# Patient Record
Sex: MALE | Race: OTHER | NOT HISPANIC OR LATINO
[De-identification: names, ages, dates, MRNs, and addresses within clinical notes are randomized per-mention and may not be internally consistent; named-entity substitution may affect disease eponyms.]

---

## 2017-07-07 ENCOUNTER — RESULT REVIEW (OUTPATIENT)
Age: 77
End: 2017-07-07

## 2017-07-07 ENCOUNTER — OUTPATIENT (OUTPATIENT)
Dept: OUTPATIENT SERVICES | Facility: HOSPITAL | Age: 77
LOS: 1 days | End: 2017-07-07
Payer: COMMERCIAL

## 2017-07-07 DIAGNOSIS — J47.9 BRONCHIECTASIS, UNCOMPLICATED: ICD-10-CM

## 2017-07-07 LAB
GRAM STN FLD: SIGNIFICANT CHANGE UP
NIGHT BLUE STAIN TISS: SIGNIFICANT CHANGE UP
SPECIMEN SOURCE: SIGNIFICANT CHANGE UP
SPECIMEN SOURCE: SIGNIFICANT CHANGE UP

## 2017-07-07 PROCEDURE — 87102 FUNGUS ISOLATION CULTURE: CPT

## 2017-07-07 PROCEDURE — 88112 CYTOPATH CELL ENHANCE TECH: CPT

## 2017-07-07 PROCEDURE — 87015 SPECIMEN INFECT AGNT CONCNTJ: CPT

## 2017-07-07 PROCEDURE — 31623 DX BRONCHOSCOPE/BRUSH: CPT

## 2017-07-07 PROCEDURE — 88112 CYTOPATH CELL ENHANCE TECH: CPT | Mod: 26

## 2017-07-07 PROCEDURE — 88305 TISSUE EXAM BY PATHOLOGIST: CPT | Mod: 26

## 2017-07-07 PROCEDURE — 87070 CULTURE OTHR SPECIMN AEROBIC: CPT

## 2017-07-07 PROCEDURE — 31624 DX BRONCHOSCOPE/LAVAGE: CPT | Mod: LT

## 2017-07-07 PROCEDURE — 87206 SMEAR FLUORESCENT/ACID STAI: CPT

## 2017-07-07 PROCEDURE — 88305 TISSUE EXAM BY PATHOLOGIST: CPT

## 2017-07-07 PROCEDURE — 87116 MYCOBACTERIA CULTURE: CPT

## 2017-07-09 LAB
CULTURE RESULTS: SIGNIFICANT CHANGE UP
SPECIMEN SOURCE: SIGNIFICANT CHANGE UP

## 2017-08-03 LAB
CULTURE RESULTS: SIGNIFICANT CHANGE UP
SPECIMEN SOURCE: SIGNIFICANT CHANGE UP

## 2017-08-26 LAB
CULTURE RESULTS: SIGNIFICANT CHANGE UP
SPECIMEN SOURCE: SIGNIFICANT CHANGE UP

## 2018-10-02 ENCOUNTER — APPOINTMENT (OUTPATIENT)
Dept: PULMONOLOGY | Facility: CLINIC | Age: 78
End: 2018-10-02
Payer: MEDICARE

## 2018-10-02 VITALS
BODY MASS INDEX: 27.28 KG/M2 | WEIGHT: 180 LBS | DIASTOLIC BLOOD PRESSURE: 73 MMHG | SYSTOLIC BLOOD PRESSURE: 176 MMHG | OXYGEN SATURATION: 97 % | HEART RATE: 57 BPM | HEIGHT: 68 IN

## 2018-10-02 VITALS — OXYGEN SATURATION: 94 %

## 2018-10-02 DIAGNOSIS — Z86.39 PERSONAL HISTORY OF OTHER ENDOCRINE, NUTRITIONAL AND METABOLIC DISEASE: ICD-10-CM

## 2018-10-02 DIAGNOSIS — I25.10 ATHEROSCLEROTIC HEART DISEASE OF NATIVE CORONARY ARTERY W/OUT ANGINA PECTORIS: ICD-10-CM

## 2018-10-02 DIAGNOSIS — C61 MALIGNANT NEOPLASM OF PROSTATE: ICD-10-CM

## 2018-10-02 DIAGNOSIS — I10 ESSENTIAL (PRIMARY) HYPERTENSION: ICD-10-CM

## 2018-10-02 DIAGNOSIS — Z23 ENCOUNTER FOR IMMUNIZATION: ICD-10-CM

## 2018-10-02 PROCEDURE — 90662 IIV NO PRSV INCREASED AG IM: CPT

## 2018-10-02 PROCEDURE — G0008: CPT

## 2018-10-02 PROCEDURE — 99214 OFFICE O/P EST MOD 30 MIN: CPT | Mod: 25

## 2018-10-02 RX ORDER — LOSARTAN POTASSIUM 100 MG/1
100 TABLET, FILM COATED ORAL
Refills: 0 | Status: ACTIVE | COMMUNITY
Start: 2018-10-02

## 2018-10-02 RX ORDER — METFORMIN HYDROCHLORIDE 1000 MG/1
1000 TABLET, COATED ORAL
Refills: 0 | Status: ACTIVE | COMMUNITY
Start: 2018-10-02

## 2018-10-02 RX ORDER — INSULIN DETEMIR 100 [IU]/ML
100 INJECTION, SOLUTION SUBCUTANEOUS
Refills: 0 | Status: ACTIVE | COMMUNITY
Start: 2018-10-02

## 2018-10-02 RX ORDER — CLOPIDOGREL BISULFATE 75 MG/1
75 TABLET, FILM COATED ORAL
Refills: 0 | Status: ACTIVE | COMMUNITY
Start: 2018-10-02

## 2018-10-02 RX ORDER — AMLODIPINE BESYLATE 10 MG/1
10 TABLET ORAL
Refills: 0 | Status: ACTIVE | COMMUNITY
Start: 2018-10-02

## 2018-10-02 RX ORDER — TAMSULOSIN HYDROCHLORIDE 0.4 MG/1
0.4 CAPSULE ORAL
Refills: 0 | Status: ACTIVE | COMMUNITY
Start: 2018-10-02

## 2018-10-02 RX ORDER — ATORVASTATIN CALCIUM 40 MG/1
40 TABLET, FILM COATED ORAL
Refills: 0 | Status: ACTIVE | COMMUNITY
Start: 2018-10-02

## 2018-10-02 RX ORDER — SITAGLIPTIN 50 MG/1
50 TABLET, FILM COATED ORAL
Refills: 0 | Status: ACTIVE | COMMUNITY
Start: 2018-10-02

## 2019-01-11 ENCOUNTER — APPOINTMENT (OUTPATIENT)
Dept: PULMONOLOGY | Facility: CLINIC | Age: 79
End: 2019-01-11
Payer: MEDICARE

## 2019-01-11 VITALS
TEMPERATURE: 97.3 F | OXYGEN SATURATION: 98 % | SYSTOLIC BLOOD PRESSURE: 188 MMHG | HEIGHT: 68 IN | DIASTOLIC BLOOD PRESSURE: 67 MMHG | HEART RATE: 66 BPM | BODY MASS INDEX: 27.28 KG/M2 | RESPIRATION RATE: 16 BRPM | WEIGHT: 180 LBS

## 2019-01-11 PROCEDURE — 99214 OFFICE O/P EST MOD 30 MIN: CPT | Mod: 25

## 2019-01-11 PROCEDURE — 71046 X-RAY EXAM CHEST 2 VIEWS: CPT

## 2019-01-11 NOTE — PHYSICAL EXAM
[Well Groomed] : well groomed [General Appearance - In No Acute Distress] : no acute distress [Heart Sounds] : normal S1 and S2 [] : no respiratory distress [Auscultation Breath Sounds / Voice Sounds] : lungs were clear to auscultation bilaterally

## 2019-01-11 NOTE — ASSESSMENT
[FreeTextEntry1] : will give script for levaquin to hold onto.  if cough worsens/fever/chills etc. he will take it.\par tessalon perle PRN, flonase for PND\par follow up with  [place], needs repeat CT chest in April.

## 2019-01-11 NOTE — REVIEW OF SYSTEMS
[Nasal Congestion] : nasal congestion [Postnasal Drip] : postnasal drip [As Noted in HPI] : as noted in HPI [Negative] : Constitutional

## 2019-01-11 NOTE — PROCEDURE
[FreeTextEntry1] : CXR: clear lungs, bronchiectatic airways on L, no focal consolidation or pleural effusions, cardiac silhouette appears normal.  No bony abnormality.\par

## 2019-01-11 NOTE — HISTORY OF PRESENT ILLNESS
[FreeTextEntry1] : worsening cough with sputum production, has post nasal drip\par around sick contacts\par no fever/chills\par not short of breath\par due for repeat chest CT in April\par will be leaving the country for 2 months and is afraid of becoming sick

## 2019-03-12 ENCOUNTER — APPOINTMENT (OUTPATIENT)
Dept: PULMONOLOGY | Facility: CLINIC | Age: 79
End: 2019-03-12
Payer: MEDICARE

## 2019-03-12 VITALS
BODY MASS INDEX: 26.67 KG/M2 | RESPIRATION RATE: 16 BRPM | WEIGHT: 176 LBS | HEART RATE: 72 BPM | OXYGEN SATURATION: 98 % | HEIGHT: 68 IN

## 2019-03-12 VITALS — DIASTOLIC BLOOD PRESSURE: 71 MMHG | SYSTOLIC BLOOD PRESSURE: 132 MMHG

## 2019-03-12 DIAGNOSIS — G47.33 OBSTRUCTIVE SLEEP APNEA (ADULT) (PEDIATRIC): ICD-10-CM

## 2019-03-12 PROCEDURE — 99214 OFFICE O/P EST MOD 30 MIN: CPT | Mod: 25

## 2019-03-12 PROCEDURE — 94060 EVALUATION OF WHEEZING: CPT

## 2019-03-12 RX ORDER — LEVOFLOXACIN 750 MG/1
750 TABLET, FILM COATED ORAL DAILY
Qty: 7 | Refills: 0 | Status: DISCONTINUED | COMMUNITY
Start: 2019-01-11 | End: 2019-03-12

## 2019-03-12 NOTE — REASON FOR VISIT
[Follow-Up] : a follow-up visit [Abnormal CT Scan] : abnormal CT Scan [Bronchiectasis] : bronchiectasis

## 2019-03-12 NOTE — PHYSICAL EXAM
[General Appearance - Well Developed] : well developed [General Appearance - Well Nourished] : well nourished [Normal Conjunctiva] : the conjunctiva exhibited no abnormalities [Normal Oropharynx] : normal oropharynx [Heart Rate And Rhythm] : heart rate and rhythm were normal [Heart Sounds] : normal S1 and S2 [] : no respiratory distress [Bowel Sounds] : normal bowel sounds [Nail Clubbing] : no clubbing of the fingernails [Oriented To Time, Place, And Person] : oriented to person, place, and time [Impaired Insight] : insight and judgment were intact [Affect] : the affect was normal [Lungs Percussion] : the lungs were normal to percussion [FreeTextEntry1] : few crackles at left base

## 2019-03-12 NOTE — PROCEDURE
[FreeTextEntry1] : Pulmonary function testing.\par There is a mild ventilatory impairment in a restrictive pattern. There is no significant bronchodilator response.

## 2019-03-12 NOTE — DISCUSSION/SUMMARY
[FreeTextEntry1] : stable bronchiectasis\par \par reinforced to retry cpap, told daughter to come in with chip and we could try changing the settings\par \par \par will call with ct chest results

## 2019-03-12 NOTE — REVIEW OF SYSTEMS
[As Noted in HPI] : as noted in HPI [Hypertension] : ~T hypertension [Nocturia] : nocturia [Diabetes] : diabetes mellitus [Negative] : Psychiatric [FreeTextEntry7] : history of bleeding diverticula [de-identified] : having difficulty using cpap machine

## 2019-03-12 NOTE — HISTORY OF PRESENT ILLNESS
[FreeTextEntry1] : Did better on own since last visit in Jan. Did not need antibiotics. Needs ct chest in April 6 month f/u\par \par No dyspnea, left with mild cough\par \par not using cpap machine

## 2019-07-09 ENCOUNTER — RX RENEWAL (OUTPATIENT)
Age: 79
End: 2019-07-09

## 2019-07-09 DIAGNOSIS — Z79.4 TYPE 2 DIABETES MELLITUS WITH DIABETIC MACULAR EDEMA, RESOLVED FOLLOWING TREATMENT, LEFT EYE: ICD-10-CM

## 2019-07-09 DIAGNOSIS — E11.37X2 TYPE 2 DIABETES MELLITUS WITH DIABETIC MACULAR EDEMA, RESOLVED FOLLOWING TREATMENT, LEFT EYE: ICD-10-CM

## 2020-01-07 ENCOUNTER — RX RENEWAL (OUTPATIENT)
Age: 80
End: 2020-01-07

## 2020-01-07 RX ORDER — GLIMEPIRIDE 4 MG/1
4 TABLET ORAL
Qty: 180 | Refills: 1 | Status: ACTIVE | COMMUNITY
Start: 2018-10-02 | End: 1900-01-01

## 2020-04-01 ENCOUNTER — APPOINTMENT (OUTPATIENT)
Dept: PULMONOLOGY | Facility: CLINIC | Age: 80
End: 2020-04-01
Payer: MEDICARE

## 2020-04-01 PROCEDURE — 99213 OFFICE O/P EST LOW 20 MIN: CPT | Mod: 95

## 2020-04-01 RX ORDER — BENZONATATE 100 MG/1
100 CAPSULE ORAL 3 TIMES DAILY
Qty: 90 | Refills: 2 | Status: DISCONTINUED | COMMUNITY
Start: 2019-01-11 | End: 2020-04-01

## 2020-04-01 RX ORDER — ISOSORBIDE MONONITRATE 30 MG/1
30 TABLET, EXTENDED RELEASE ORAL
Qty: 30 | Refills: 0 | Status: ACTIVE | COMMUNITY
Start: 2020-03-19

## 2020-04-01 RX ORDER — FLASH GLUCOSE SENSOR
KIT MISCELLANEOUS
Qty: 2 | Refills: 0 | Status: ACTIVE | COMMUNITY
Start: 2019-10-23

## 2020-04-01 RX ORDER — SEMAGLUTIDE 1.34 MG/ML
2 INJECTION, SOLUTION SUBCUTANEOUS
Qty: 2 | Refills: 0 | Status: ACTIVE | COMMUNITY
Start: 2020-01-23

## 2020-04-01 RX ORDER — BLOOD SUGAR DIAGNOSTIC
STRIP MISCELLANEOUS
Qty: 300 | Refills: 0 | Status: ACTIVE | COMMUNITY
Start: 2020-03-25

## 2020-04-01 NOTE — HISTORY OF PRESENT ILLNESS
[Home] : at home, [unfilled] , at the time of the visit. [Medical Office: ([facility])___] : at ~his/her~ medical office located in [place] [TextBox_4] : This visit was provided via telehealth using real-time 2-way audio visual technology. The patient, NEMENCIO REYES , was located at home, [address] [place] [address]\[place] [address]  at the time of the visit.\par The provider, Isaac Dai, was located at [address] at the time of the visit. \par Patient afraid to come in to office because of COVID. Minimal cough no recent inhalers\par \par  The patient, Mr. NEMENCIO REYES  and Physician Isaac Aguirre participated in the telehealth encounter.\par \par Verbal consent obtained by  from patient\par \par \par Patient feeling well without respiratory or constitutional symptoms.

## 2020-04-15 ENCOUNTER — APPOINTMENT (OUTPATIENT)
Dept: PULMONOLOGY | Facility: CLINIC | Age: 80
End: 2020-04-15
Payer: MEDICARE

## 2020-04-15 PROCEDURE — 99214 OFFICE O/P EST MOD 30 MIN: CPT | Mod: 95

## 2020-04-15 NOTE — HISTORY OF PRESENT ILLNESS
[Home] : at home, [unfilled] , at the time of the visit. [Medical Office: ([facility])___] : at the medical office located in  [TextBox_4] : This visit was provided via telehealth using real-time 2-way audio visual technology. The patient, NEMENCIO REYES , was located at home, [address] [place] [address]\[place] [address]  at the time of the visit.\par The provider, Isaac Dai, was located at office [address] [place] [address] at the time of the visit. \par \par  The patient, Mr. NEMENCIO REYES  and Physician Isaac Aguirre participated in the telehealth encounter.\par \par Verbal consent obtained by  from patient\par \par Went for f/u CT for abnormality. \par Got call from radiologist indicative of COVID.\par \par Not ill.\par Mild cough 3x day. Not new relatively chronic.\par No fever. \par No SOB.\par Feeling baseline. \par Daughter and wife are positive. Wife in hospital on ventilator.\par \par

## 2020-04-15 NOTE — ASSESSMENT
[FreeTextEntry1] : Based upon symptom complex and CT there is a fair degree of likelihood that patient is infected with coronavirus. Based upon current recommendations and due to the fact that He  is doing well without significant respiratory symptoms I have recommended that the patient remain home in isolation. Should He  develop increasing respiratory symptoms this patient will call or go to the emergency room and COVID testing can be performed at that time.\par \par Continue present care with rest home, fluids, PRN Tylenol. \par Close observation\par Daily temps and sats.\par Will touch base in 1-2 days.\par \par Other CT findings stable\par

## 2020-07-14 ENCOUNTER — APPOINTMENT (OUTPATIENT)
Dept: PULMONOLOGY | Facility: CLINIC | Age: 80
End: 2020-07-14
Payer: MEDICARE

## 2020-07-14 VITALS
HEIGHT: 68 IN | DIASTOLIC BLOOD PRESSURE: 65 MMHG | RESPIRATION RATE: 16 BRPM | TEMPERATURE: 97.8 F | WEIGHT: 175 LBS | BODY MASS INDEX: 26.52 KG/M2 | OXYGEN SATURATION: 98 % | HEART RATE: 56 BPM | SYSTOLIC BLOOD PRESSURE: 170 MMHG

## 2020-07-14 PROCEDURE — 36415 COLL VENOUS BLD VENIPUNCTURE: CPT

## 2020-07-14 PROCEDURE — 99214 OFFICE O/P EST MOD 30 MIN: CPT | Mod: 25

## 2020-07-14 PROCEDURE — 71046 X-RAY EXAM CHEST 2 VIEWS: CPT

## 2020-07-14 RX ORDER — AZITHROMYCIN 250 MG/1
250 TABLET, FILM COATED ORAL
Qty: 6 | Refills: 0 | Status: DISCONTINUED | COMMUNITY
Start: 2020-04-24 | End: 2020-07-14

## 2020-07-15 LAB
SARS-COV-2 IGG SERPL IA-ACNC: 102 AU/ML
SARS-COV-2 IGG SERPL QL IA: POSITIVE
SARS-COV-2 N GENE NPH QL NAA+PROBE: NOT DETECTED

## 2020-07-15 NOTE — ASSESSMENT
[FreeTextEntry1] : swab for COVID and do antibodies.\par r/t PFT \par Follow CAT scan of the chest.\par If COVID positive antibodies I see no indication to repeat CAT scan for groundglass opacities.

## 2020-07-15 NOTE — HISTORY OF PRESENT ILLNESS
[Former] : former [TextBox_4] : had f/u ct in April ? COVID pneumonia,never got tested, wife  of COVID. No major symptoms, stable with same sob with stairs. Did take a Z-Thai in April\par Minimal cough and some mucus, clear without significant change.\par Feeling generally well. [TextBox_11] : 1 [TextBox_13] : 30 [YearQuit] : 1992

## 2020-07-15 NOTE — PHYSICAL EXAM
[No Acute Distress] : no acute distress [Normal Appearance] : normal appearance [Normal Rate/Rhythm] : normal rate/rhythm [No Resp Distress] : no resp distress [Clear to Auscultation Bilaterally] : clear to auscultation bilaterally [Benign] : benign [Normal Gait] : normal gait [No Clubbing] : no clubbing [No Edema] : no edema [Oriented x3] : oriented x3 [Normal Affect] : normal affect [Normal Oropharynx] : normal oropharynx [TextBox_2] : Pulse ox. 98

## 2020-07-15 NOTE — PROCEDURE
[FreeTextEntry1] : CAT scan of April 15, 2020 reviewed.\par Of note is presence of bilateral groundglass opacities which were new.

## 2020-07-15 NOTE — DISCUSSION/SUMMARY
[FreeTextEntry1] : Bronchiectasis\par Bilateral groundglass opacities on prior CT likely related to prior COVID infection.\par Nodular opacity in left lower lobe stable.\par

## 2020-07-15 NOTE — CONSULT LETTER
[Dear  ___] : Dear ~OSCAR, [Consult Letter:] : I had the pleasure of evaluating your patient, [unfilled]. [Please see my note below.] : Please see my note below. [Consult Closing:] : Thank you very much for allowing me to participate in the care of this patient.  If you have any questions, please do not hesitate to contact me. [Sincerely,] : Sincerely, [FreeTextEntry3] : Isaac Dai MD FCCP\par  [FreeTextEntry2] : Isaac Hardy MD\par

## 2020-07-17 ENCOUNTER — RESULT CHARGE (OUTPATIENT)
Age: 80
End: 2020-07-17

## 2020-07-17 ENCOUNTER — APPOINTMENT (OUTPATIENT)
Dept: PULMONOLOGY | Facility: CLINIC | Age: 80
End: 2020-07-17
Payer: MEDICARE

## 2020-07-17 VITALS
DIASTOLIC BLOOD PRESSURE: 51 MMHG | BODY MASS INDEX: 26.52 KG/M2 | SYSTOLIC BLOOD PRESSURE: 109 MMHG | OXYGEN SATURATION: 98 % | WEIGHT: 175 LBS | HEART RATE: 58 BPM | HEIGHT: 68 IN | RESPIRATION RATE: 12 BRPM

## 2020-07-17 LAB
POCT - HEMOGLOBIN (HGB), QUANTITATIVE, TRANSCUTANEOUS: 10.8
POCT - HEMOGLOBIN (HGB), QUANTITATIVE, TRANSCUTANEOUS: 10.8

## 2020-07-17 PROCEDURE — 94727 GAS DIL/WSHOT DETER LNG VOL: CPT

## 2020-07-17 PROCEDURE — 94729 DIFFUSING CAPACITY: CPT

## 2020-07-17 PROCEDURE — 94060 EVALUATION OF WHEEZING: CPT

## 2020-07-17 PROCEDURE — 88738 HGB QUANT TRANSCUTANEOUS: CPT

## 2021-06-16 ENCOUNTER — APPOINTMENT (OUTPATIENT)
Dept: PULMONOLOGY | Facility: CLINIC | Age: 81
End: 2021-06-16
Payer: MEDICARE

## 2021-06-16 VITALS
OXYGEN SATURATION: 98 % | DIASTOLIC BLOOD PRESSURE: 66 MMHG | SYSTOLIC BLOOD PRESSURE: 157 MMHG | TEMPERATURE: 97.7 F | HEART RATE: 66 BPM

## 2021-06-16 DIAGNOSIS — R05 COUGH: ICD-10-CM

## 2021-06-16 PROCEDURE — 99214 OFFICE O/P EST MOD 30 MIN: CPT

## 2021-06-16 RX ORDER — MONTELUKAST 10 MG/1
10 TABLET, FILM COATED ORAL
Qty: 30 | Refills: 3 | Status: ACTIVE | COMMUNITY
Start: 2021-06-16 | End: 1900-01-01

## 2021-06-16 NOTE — DISCUSSION/SUMMARY
[FreeTextEntry1] : Bronchiectasis\par Bilateral groundglass opacities on prior CT likely related to prior COVID infection.  Resolved.\par Nodular opacity in left lower lobe stable.\par Cough most likely related to upper airway etiology or GE reflux disease.\par Cannot exclude sinusitis.\par Status post Covid pneumonia.

## 2021-06-16 NOTE — ASSESSMENT
[FreeTextEntry1] : Trial of Montelukast\par To start PPI pre dinner\par If no response consider antibiotic or ENT evaluation.\par Follow CT.\par

## 2021-06-16 NOTE — PHYSICAL EXAM
[Normal Oropharynx] : normal oropharynx [No Acute Distress] : no acute distress [Normal Appearance] : normal appearance [Normal Rate/Rhythm] : normal rate/rhythm [No Resp Distress] : no resp distress [Clear to Auscultation Bilaterally] : clear to auscultation bilaterally [Normal Gait] : normal gait [Benign] : benign [No Clubbing] : no clubbing [No Edema] : no edema [Oriented x3] : oriented x3 [Normal Affect] : normal affect [TextBox_2] : Pulse ox. 98

## 2021-06-16 NOTE — CONSULT LETTER
[Dear  ___] : Dear ~OSCAR, [Consult Letter:] : I had the pleasure of evaluating your patient, [unfilled]. [Please see my note below.] : Please see my note below. [Consult Closing:] : Thank you very much for allowing me to participate in the care of this patient.  If you have any questions, please do not hesitate to contact me. [Sincerely,] : Sincerely, [FreeTextEntry2] : Isaac Hardy MD\par  [FreeTextEntry3] : Isaac Dai MD FCCP\par

## 2021-06-16 NOTE — HISTORY OF PRESENT ILLNESS
[Former] : former [TextBox_4] : Occasional cough and phlegm. Green or yellow. Feels in throat. Past month. Does have seasonal allergies. Frequently clearing throat\par Had cardiac workup yesterday for SOB. No wheezing. Echo told ? abnormality on MV. For AAYUSH. No pulmonary hypertension. \par Saw GI for acid type feeling.  For Protonix. \par \par \par  [TextBox_11] : 1 [TextBox_13] : 30 [YearQuit] : 1992

## 2021-09-10 DIAGNOSIS — Z20.822 CONTACT WITH AND (SUSPECTED) EXPOSURE TO COVID-19: ICD-10-CM

## 2021-09-14 ENCOUNTER — APPOINTMENT (OUTPATIENT)
Dept: PULMONOLOGY | Facility: CLINIC | Age: 81
End: 2021-09-14
Payer: MEDICARE

## 2021-09-14 ENCOUNTER — RESULT CHARGE (OUTPATIENT)
Age: 81
End: 2021-09-14

## 2021-09-14 VITALS — HEART RATE: 62 BPM | DIASTOLIC BLOOD PRESSURE: 70 MMHG | OXYGEN SATURATION: 96 % | SYSTOLIC BLOOD PRESSURE: 168 MMHG

## 2021-09-14 DIAGNOSIS — J12.82 COVID-19: ICD-10-CM

## 2021-09-14 DIAGNOSIS — U07.1 COVID-19: ICD-10-CM

## 2021-09-14 LAB — POCT - HEMOGLOBIN (HGB), QUANTITATIVE, TRANSCUTANEOUS: 9.9

## 2021-09-14 PROCEDURE — ZZZZZ: CPT

## 2021-09-14 PROCEDURE — 94727 GAS DIL/WSHOT DETER LNG VOL: CPT

## 2021-09-14 PROCEDURE — G0008: CPT

## 2021-09-14 PROCEDURE — 94010 BREATHING CAPACITY TEST: CPT

## 2021-09-14 PROCEDURE — 90662 IIV NO PRSV INCREASED AG IM: CPT

## 2021-09-14 PROCEDURE — 94729 DIFFUSING CAPACITY: CPT

## 2021-09-14 PROCEDURE — 99214 OFFICE O/P EST MOD 30 MIN: CPT | Mod: 25

## 2021-09-14 PROCEDURE — 88738 HGB QUANT TRANSCUTANEOUS: CPT

## 2021-09-14 RX ORDER — EMPAGLIFLOZIN 25 MG/1
25 TABLET, FILM COATED ORAL
Qty: 90 | Refills: 0 | Status: ACTIVE | COMMUNITY
Start: 2021-06-17

## 2021-09-14 RX ORDER — INSULIN DETEMIR 100 [IU]/ML
100 INJECTION, SOLUTION SUBCUTANEOUS
Qty: 18 | Refills: 0 | Status: ACTIVE | COMMUNITY
Start: 2021-06-16

## 2021-09-14 RX ORDER — PANTOPRAZOLE 40 MG/1
40 TABLET, DELAYED RELEASE ORAL
Qty: 90 | Refills: 0 | Status: ACTIVE | COMMUNITY
Start: 2021-06-15

## 2021-09-14 RX ORDER — ISOSORBIDE MONONITRATE 60 MG/1
60 TABLET, EXTENDED RELEASE ORAL
Qty: 90 | Refills: 0 | Status: ACTIVE | COMMUNITY
Start: 2021-06-23

## 2021-09-14 NOTE — PROCEDURE
[FreeTextEntry1] : CAT scan of 6/15/21 reviewed.\par \par 09/14/2021\par Pulmonary function testing\par There is a mild ventilatory impairment in a restrictive pattern. Normal Lung Volumes. Diffusion capacity is normal. \par Pulmonary function essentially stable with the exception of mild decrement in diffusion capacity.\par \par flu shot given

## 2021-09-14 NOTE — DISCUSSION/SUMMARY
[FreeTextEntry1] : Bronchiectasis\par Bilateral groundglass opacities on prior CT likely related to prior COVID infection.  Resolved.\par Nodular opacity in left lower lobe stable.\par Cough resolved\par Status post Covid pneumonia.

## 2021-09-14 NOTE — ASSESSMENT
[FreeTextEntry1] : Trial off Montelukast\par Pantoprazole.\par Repeat CAT scan in 1 year and follow-up post CT.\par Follow-up sooner on a as needed basis.\par

## 2021-09-14 NOTE — PHYSICAL EXAM
[No Acute Distress] : no acute distress [Normal Oropharynx] : normal oropharynx [Normal Appearance] : normal appearance [Normal Rate/Rhythm] : normal rate/rhythm [No Resp Distress] : no resp distress [Clear to Auscultation Bilaterally] : clear to auscultation bilaterally [Benign] : benign [Normal Gait] : normal gait [No Clubbing] : no clubbing [No Edema] : no edema [Oriented x3] : oriented x3 [Normal Affect] : normal affect [TextBox_2] : Pulse ox. 98

## 2021-10-06 PROBLEM — U07.1 PNEUMONIA DUE TO COVID-19 VIRUS: Status: ACTIVE | Noted: 2020-04-15

## 2021-10-06 PROBLEM — I10 ESSENTIAL HYPERTENSION: Status: ACTIVE | Noted: 2018-10-02

## 2022-08-16 ENCOUNTER — APPOINTMENT (OUTPATIENT)
Dept: PULMONOLOGY | Facility: CLINIC | Age: 82
End: 2022-08-16

## 2022-08-16 VITALS
SYSTOLIC BLOOD PRESSURE: 170 MMHG | OXYGEN SATURATION: 99 % | TEMPERATURE: 98.1 F | HEART RATE: 59 BPM | DIASTOLIC BLOOD PRESSURE: 70 MMHG

## 2022-08-16 VITALS — SYSTOLIC BLOOD PRESSURE: 161 MMHG | DIASTOLIC BLOOD PRESSURE: 56 MMHG

## 2022-08-16 LAB — POCT - HEMOGLOBIN (HGB), QUANTITATIVE, TRANSCUTANEOUS: 13.8

## 2022-08-16 PROCEDURE — ZZZZZ: CPT

## 2022-08-16 PROCEDURE — 94010 BREATHING CAPACITY TEST: CPT

## 2022-08-16 PROCEDURE — 94727 GAS DIL/WSHOT DETER LNG VOL: CPT

## 2022-08-16 PROCEDURE — 94729 DIFFUSING CAPACITY: CPT

## 2022-08-16 PROCEDURE — 99213 OFFICE O/P EST LOW 20 MIN: CPT | Mod: 25

## 2022-08-16 PROCEDURE — 88738 HGB QUANT TRANSCUTANEOUS: CPT

## 2022-08-16 NOTE — HISTORY OF PRESENT ILLNESS
[Former] : former [TextBox_4] : Had ct chest \par here for f/u\par breathing well\par No inhaler use\par less cough and mucus\par \par Had diverticular bleed in 5/22 no hosp. or transfusion.\par \par \par \par \par  [TextBox_11] : 1 [TextBox_13] : 30 [YearQuit] : 1992

## 2022-08-16 NOTE — DISCUSSION/SUMMARY
[FreeTextEntry1] : Bronchiectasis\par Bilateral groundglass opacities on prior CT likely related to prior COVID infection.  Resolved.\par Nodular opacity in left lower lobe stable.\par

## 2022-08-16 NOTE — ASSESSMENT
[FreeTextEntry1] : Continue observation.\par Repeat CAT scan in 1 year and follow-up post CT.\par Follow-up sooner on a as needed basis.\par

## 2022-08-16 NOTE — PROCEDURE
[FreeTextEntry1] : CAT scan of 6/21/22 reviewed.\par \par 08/16/2022\par Pulmonary function testing\par There is a mild ventilatory impairment in a restrictive pattern. There is elevation in the RV/TLC ratio indicative of possible air trapping. Normal Lung Volumes. Diffusion capacity is normal. \par \par

## 2023-08-08 ENCOUNTER — APPOINTMENT (OUTPATIENT)
Dept: PULMONOLOGY | Facility: CLINIC | Age: 83
End: 2023-08-08
Payer: MEDICARE

## 2023-08-08 VITALS
BODY MASS INDEX: 23.64 KG/M2 | WEIGHT: 156 LBS | HEIGHT: 68 IN | DIASTOLIC BLOOD PRESSURE: 55 MMHG | HEART RATE: 62 BPM | OXYGEN SATURATION: 98 % | SYSTOLIC BLOOD PRESSURE: 138 MMHG

## 2023-08-08 DIAGNOSIS — R93.89 ABNORMAL FINDINGS ON DIAGNOSTIC IMAGING OF OTHER SPECIFIED BODY STRUCTURES: ICD-10-CM

## 2023-08-08 LAB — POCT - HEMOGLOBIN (HGB), QUANTITATIVE, TRANSCUTANEOUS: 13.9

## 2023-08-08 PROCEDURE — 88738 HGB QUANT TRANSCUTANEOUS: CPT

## 2023-08-08 PROCEDURE — 99213 OFFICE O/P EST LOW 20 MIN: CPT | Mod: 25

## 2023-08-08 PROCEDURE — ZZZZZ: CPT

## 2023-08-08 PROCEDURE — 94010 BREATHING CAPACITY TEST: CPT

## 2023-08-08 PROCEDURE — 94729 DIFFUSING CAPACITY: CPT

## 2023-08-08 PROCEDURE — 94727 GAS DIL/WSHOT DETER LNG VOL: CPT

## 2023-08-10 NOTE — DISCUSSION/SUMMARY
[FreeTextEntry1] : Bronchiectasis Bilateral groundglass opacities on prior CT likely related to prior COVID infection.  Resolved. Nodular opacity in left lower lobe stable. Clinically and functionally stable.

## 2023-08-10 NOTE — PROCEDURE
[FreeTextEntry1] : CAT scan of 6/21/22 reviewed.  08/08/2023 Pulmonary function testing There is a mild ventilatory impairment in a restrictive pattern. There is elevation in the RV/TLC ratio indicative of possible air trapping. Diffusion capacity is normal.  PFT attached relatively stable function.

## 2023-08-10 NOTE — ASSESSMENT
[FreeTextEntry1] : Continue observation. Repeat CAT scan  Further recommendations after review of above.

## 2023-08-31 ENCOUNTER — NON-APPOINTMENT (OUTPATIENT)
Age: 83
End: 2023-08-31

## 2023-09-08 ENCOUNTER — NON-APPOINTMENT (OUTPATIENT)
Age: 83
End: 2023-09-08

## 2024-07-02 ENCOUNTER — APPOINTMENT (OUTPATIENT)
Dept: PULMONOLOGY | Facility: CLINIC | Age: 84
End: 2024-07-02
Payer: MEDICARE

## 2024-07-02 VITALS
SYSTOLIC BLOOD PRESSURE: 138 MMHG | OXYGEN SATURATION: 97 % | DIASTOLIC BLOOD PRESSURE: 55 MMHG | HEART RATE: 62 BPM | RESPIRATION RATE: 16 BRPM

## 2024-07-02 DIAGNOSIS — J47.9 BRONCHIECTASIS, UNCOMPLICATED: ICD-10-CM

## 2024-07-02 DIAGNOSIS — R91.1 SOLITARY PULMONARY NODULE: ICD-10-CM

## 2024-07-02 PROCEDURE — 94010 BREATHING CAPACITY TEST: CPT

## 2024-07-02 PROCEDURE — 94727 GAS DIL/WSHOT DETER LNG VOL: CPT

## 2024-07-02 PROCEDURE — 94729 DIFFUSING CAPACITY: CPT

## 2024-07-02 PROCEDURE — 99214 OFFICE O/P EST MOD 30 MIN: CPT | Mod: 25

## 2025-07-01 ENCOUNTER — APPOINTMENT (OUTPATIENT)
Dept: PULMONOLOGY | Facility: CLINIC | Age: 85
End: 2025-07-01

## 2025-07-07 ENCOUNTER — APPOINTMENT (OUTPATIENT)
Dept: PULMONOLOGY | Facility: CLINIC | Age: 85
End: 2025-07-07
Payer: MEDICARE

## 2025-07-07 ENCOUNTER — NON-APPOINTMENT (OUTPATIENT)
Age: 85
End: 2025-07-07

## 2025-07-07 VITALS
HEIGHT: 68 IN | WEIGHT: 154 LBS | RESPIRATION RATE: 16 BRPM | OXYGEN SATURATION: 95 % | BODY MASS INDEX: 23.34 KG/M2 | DIASTOLIC BLOOD PRESSURE: 54 MMHG | SYSTOLIC BLOOD PRESSURE: 131 MMHG | HEART RATE: 59 BPM

## 2025-07-07 LAB — POCT - HEMOGLOBIN (HGB), QUANTITATIVE, TRANSCUTANEOUS: 11.2

## 2025-07-07 PROCEDURE — ZZZZZ: CPT

## 2025-07-07 PROCEDURE — 99213 OFFICE O/P EST LOW 20 MIN: CPT | Mod: 25

## 2025-07-07 PROCEDURE — 94727 GAS DIL/WSHOT DETER LNG VOL: CPT

## 2025-07-07 PROCEDURE — 94729 DIFFUSING CAPACITY: CPT

## 2025-07-07 PROCEDURE — 88738 HGB QUANT TRANSCUTANEOUS: CPT

## 2025-07-07 PROCEDURE — 94010 BREATHING CAPACITY TEST: CPT

## 2025-07-07 PROCEDURE — 71046 X-RAY EXAM CHEST 2 VIEWS: CPT
